# Patient Record
(demographics unavailable — no encounter records)

---

## 2024-11-22 NOTE — WORK
[Sprain/Strain] : sprain/strain [Was the competent medical cause of the injury] : was the competent medical cause of the injury [Are consistent with the injury] : are consistent with the injury [Consistent with my objective findings] : consistent with my objective findings [Does not reveal pre-existing condition(s) that may affect treatment/prognosis] : does not reveal pre-existing condition(s) that may affect treatment/prognosis [15+ days] : 15+ days [Patient] : patient [No Rx restrictions] : No Rx restrictions. [I provided the services listed above] :  I provided the services listed above. [Mild Partial] : mild partial [Can return to work without limitations on ______] : can return to work without limitations on [unfilled] [FreeTextEntry1] : fair [Heavy Work:] : Heavy Work: Exerting 50 to 100 pounds of force occasionally, and/or 25 to 50 pounds of force frequently, and/or 10 to 20 pounds of force constantly to move objects. Physical demand requirements are in excess of those for Medium Work

## 2024-11-22 NOTE — HISTORY OF PRESENT ILLNESS
[de-identified] : 11/22/2024: f/u for left knee.  No longer ambulating with cane.  taking Cyclobenzaprine at night prn. working light duty. reports significant improvement in symptoms since last visit.   07/19/24: Follow up on the LT knee. the patient ambulates with a cane. The patient reports he wanted to continue PT, but did not receive authorization. Pt reports he is taking meloxicam and cyclobenzaprine with relief. pain is improving but has difficulty walking distances without a cane.   5/17/24: Follow up left knee. Patient is attending PT 2x a week, would like to continue with PT as he is slowly improving. Patient is still taking meloxicam PRN. working light duty. scheduled for EMG per dr kirkland.   3/15/23: f/u on left knee CSI 2/2/24 with some relief. patient stopped going to PT 3 weeks ago. patient has been working light duty, does not feel ready for full duty. patient is still taking meloxicam PRN for pain. Patient states he had an epidural on 3/6/24.    02/02/2024: F/u on his LT knee. Feeling stronger with PT. still has pain with standing. Walking with a cane. has been discussing return to work with an accommodation. also would like CSI.   12/22/2023: Follow up patient is here today for his left knee. Reports going to PT and HEP 2x a week with some improvement. Admits to taking Meloxicam and cyclobenzaprine with some relief. Patient is ambulating with a cane. He has lumbar epidural scheduled in january. remain out of work.   11/10/2023 Following up on the LT knee. Going to PT, reports partial improvement in symptoms. still has back, hip, thigh and knee pain. still ambulating with cane. has difficulty standing and sitting for prolonged periods. has not returned to work. he is planning epidural soon, has not received yet as he wanted to see how his symptoms progressed. upcoming visit with dr kirkland next week for back and hip. still cannot completely straighten the knee, working on extension in PT and HEP.   09/15/2023 Following up on the LT knee. Feels like his last CSI helped with some of the pain. Going to PT 2x a week. Feels that he has been having some slow progress. saw dr kirkland for back and hip, doing additional PT and considering epidural. has not returned to work.   8/15: Patient presents for follow up. Has not returned to work. He had the MRI's completed. Pain persists in back, hip and knee. He reports intermittent development of radicular symptoms from the back down the posterior leg. Denies bowel or bladder dysfunction. MDP provided mild relief. taking flexeril and meloxicam   (History of Present Illness) Patient age in years is  52 Occupation is Public Safety  Body part causing symptoms is the  LT hip and LT knee Symptoms began 07/17/23  He was at work, walking down the stairwell, slipped and fell down the last couple of steps. Location of pain is posterior and lateral Quality of pain is sharp Pain score at rest is  3 Pain score during activity is  8 Radicular symptoms are not present  Prior treatments include medrol dose pack with a little relief during. Flexeril with some relief.  Patient's condition is associated with worker's compensation claim, date of injury is 7/17/2023 patient denies knee, hip or back pain prior to the injury he has not returned to work since the injury  MRI LT KNEE IMPRESSION: Low-grade partial tears of the PCL and ACL. Horizontal tear of the lateral meniscal body segment. Peripheral tear of the medial meniscus body segment with intermixed granulation tissue at the meniscocapsular junction. Mild extensor mechanism tendinosis without discrete tear. Significant images have been marked and are available for review on the physician's portal.  MRI LT HIP IMPRESSION: Acute grade 1 strain of the left adductor muscles. Nondisplaced tearing of the superior and anterior left acetabular labrum. Mild partial-thickness cartilage loss throughout the left hip joint.  MRI Lumbar IMPRESSION: 1. L5-S1: Left lateral recess and left foraminal disc herniation with annular tear causing left lateral recess stenosis. 2. L2-L3: Disc bulge impinging on the ventral thecal sac.

## 2024-11-22 NOTE — DISCUSSION/SUMMARY
[de-identified] : (Assessment and Plan)  History, clinical examination and imaging were most consistent with: -left knee PCL sprain with medial and lateral meniscus tear  The diagnosis was explained in detail. The potential non-surgical and surgical treatments were reviewed. The relative risks and benefits of each option were considered relative to the patient's age, activity level, medical history, symptom severity and previously attempted treatments.   The patient was advised to consult with their primary medical provider prior to initiation of any new medications to reduce the risk of adverse effects specific to their long-term home medications and medical history. The risk of gastrointestinal irritation and kidney injury specific to long-term NSAID use was discussed.     -Patient is improving.  -Meloxicam as needed for pain. -Flexeril as needed for muscle spasms.  -Work status advanced to full duty.  -Follow up with Dr. Cespedes as indicated for his concomitant lumbar and hip pathology. -Follow up in 2 months, consider MMI.  (MDM)   Problem Complexity -Moderate: acute, complicated injury  Risk -Moderate: prescription medication

## 2024-11-22 NOTE — IMAGING
[Left] : left knee [All Views] : anteroposterior, lateral, skyline, and anteroposterior standing [There are no fractures, subluxations or dislocations. No significant abnormalities are seen] : There are no fractures, subluxations or dislocations. No significant abnormalities are seen [de-identified] : (Physical Exam: Knee)  Laterality is left  Skin is intact  Effusion is not present  Atrophy is not present Antalgic gait is not present   Medial joint line tenderness is present  Lateral joint line tenderness is not present  Patellar tenderness is not present  Calf tenderness is not present   Knee extension is 0 Knee flexion is 125  Quadriceps strength is 5-/5 Hamstring strength is 5/5  Lachman is normal Posterior drawer is normal Varus stress stability is normal Valgus stress stability is normal  Medial Wilmer test is abnormal Lateral Wilmer test is abnormal Patellofemoral grind test is normal Patellar apprehension test is normal

## 2024-11-22 NOTE — DATA REVIEWED
[MRI] : MRI [Knee] : knee [Report was reviewed and noted in the chart] : The report was reviewed and noted in the chart [I reviewed the films/CD and agree] : I reviewed the films/CD and agree [FreeTextEntry1] : small tear medial and lateral meniscus, PCL sprain, ACL intact